# Patient Record
Sex: FEMALE | Race: WHITE | NOT HISPANIC OR LATINO | URBAN - METROPOLITAN AREA
[De-identification: names, ages, dates, MRNs, and addresses within clinical notes are randomized per-mention and may not be internally consistent; named-entity substitution may affect disease eponyms.]

---

## 2017-07-27 ENCOUNTER — ALLSCRIPTS OFFICE VISIT (OUTPATIENT)
Dept: OTHER | Facility: OTHER | Age: 27
End: 2017-07-27

## 2017-07-27 ENCOUNTER — GENERIC CONVERSION - ENCOUNTER (OUTPATIENT)
Dept: OTHER | Facility: OTHER | Age: 27
End: 2017-07-27

## 2017-09-07 ENCOUNTER — GENERIC CONVERSION - ENCOUNTER (OUTPATIENT)
Dept: OTHER | Facility: OTHER | Age: 27
End: 2017-09-07

## 2017-09-07 ENCOUNTER — ALLSCRIPTS OFFICE VISIT (OUTPATIENT)
Dept: OTHER | Facility: OTHER | Age: 27
End: 2017-09-07

## 2018-01-22 VITALS
HEIGHT: 65 IN | DIASTOLIC BLOOD PRESSURE: 78 MMHG | WEIGHT: 132 LBS | SYSTOLIC BLOOD PRESSURE: 120 MMHG | BODY MASS INDEX: 21.99 KG/M2

## 2018-01-22 VITALS
DIASTOLIC BLOOD PRESSURE: 60 MMHG | HEIGHT: 64 IN | SYSTOLIC BLOOD PRESSURE: 98 MMHG | WEIGHT: 132 LBS | BODY MASS INDEX: 22.53 KG/M2

## 2018-03-07 NOTE — CONSULTS
Plan    1  Azelastine HCl - 0 1 % Nasal Solution; INSERT 2 SQUIRTS IN EACH NOSTRIL   TWICE DAILY    2  Nasal endoscopy, diagnostic, unilateral/bilateral - POC; Status:Active - Perform Order;   Requested for:[id number];     Assessment    1  Nasal congestion (478 19) (R09 81)   2  History of environmental allergies (V15 09) (Z91 09)   3  Never a smoker   4  Social alcohol use (Z78 9)   5  Family history of cardiac disorder (V17 49) (Z82 49) : Father   6  Family history of diabetes mellitus (V18 0) (Z83 3) : Father   7  Employed   8  Single   9  Hypertrophy of inferior nasal turbinate (478 0) (J34 3)   10  Deviated nasal septum (470) (J34 2)   11  Allergic rhinitis due to other allergic trigger, unspecified chronicity, unspecified    seasonality (477 8) (J30 89)   12  Nasal polyposis (471 9) (J33 9)    Chief Complaint  Chief Complaint Free Text Note Form: Pt presents today with "nasal congestion for 4 weeks"      History of Present Illness  HPI: She presents with nasal congestion for the past 4 weeks  She has a history of associated allergies  She has been allergy tested and has known allergies to dust mites, grasses, and trees  She was taking Zyrtec for her symptoms  Symptoms worse in Spring/Summer  She has frequent nose blowing, sometimes productive of clear rhinorrhea, occasionally green  She denies recent URI symptoms  She has associated decrease in her sense of smell  She denies significant sinus pressure, pain or headaches  Denies ocular allergy symptoms  Some sneezing  For her symptoms, she has tried a neti-pot  She tried Vicks nasal spray for not more than 2 days  This helped initially  She was also given Dymista sample but stopped taking it because of insurance coverage issues  This did help her symptoms, though  Of note, she has a history of immunotherapy and stopped after 2 years for lack of beneficial effect        Review of Systems  Complete ENT ROS [facility]:   Eyes: No complaints of itching, excessive tearing or vision changes  Ears: No complaints of hearing loss, discharge, imbalance, recent ear infections, or tinnitus  Nose: obstruction, discharge or runny nose, epistaxis and loss of smell  Mouth: No sores in mouth, no altered taste, no dental problems  Throat: No complaints of throat pain, no difficulty swallowing, no hoarseness  Neck: No neck soreness, no neck pain, no neck lumps or swelling  Genitourinary: No complaints of dysuria, flank pain or frequent urination  Cardiovascular: No complaints of chest pain or palpitations  Respiratory: No complaints of shortness of breath, cough or wheezing  Gastrointestinal: No complaints of heartburn, nausea/vomiting, or constipation  Neurological: No complaints of headache, convulsions or memory loss  ROS Reviewed:   ROS reviewed  Vitals  Signs   Recorded: [id number] 53:48LR   Systolic: 692, RUE, Sitting  Diastolic: 78, RUE, Sitting  Height: 5 ft 4 5 in  Weight: 132 lb   BMI Calculated: 22 31  BSA Calculated: 1 65    Physical Exam  Constitutional:  Well developed, well nourished and groomed, in no acute distress  Eyes:  Extra-ocular movements intact, pupils equally round and reactive to light and accommodation, the lids and conjunctivae are normal in appearance  HEENT:    Head: Atraumatic, normocephalic, no visible scalp lesions, bony palpation unremarkable without stepoffs, parotid and submandibular salivary glands non-tender to palpation and without masses bilaterally  Ears:  Auricles normal in appearance bilaterally, mastoid prominence non-tender, external auditory canals clear bilaterally, tympanic membranes intact bilaterally without evidence of middle ear effusion or masses, normal appearing ossicles  Nose/Sinuses:  External appearance unremarkable, no maxillary or frontal sinus tenderness to palpation bilaterally, anterior rhinoscopy reveals edematous appearing mucosa, without polyps or masses   Deviated septum to the left with IT hypertrophy    Oral Cavity:  Moist mucus membranes, gums dentition unremarkable, no oral mucosal masses or lesions, floor of mouth soft, tongue mobile without masses or lesions  Oropharynx:  Base of tongue soft and without masses, tonsils bilaterally unremarkable, soft palate mucosa unremarkable, laryngeal mirror exam unrevealing  Neck:  No visible or palpable cervical lesions or lymphadenopathy, thyroid gland is normal in size and symmetry and without masses, normal laryngeal elevation with swallowing  Cardiovascular:  Normal rate and rhythm, no palpable thrills, no jugulovenous distension observed  Respiratory:  Normal respiratory effort without evidence of retractions or use of accessory muscles  Integument:  Normal appearing without observed masses or lesions  Neurologic:  Cranial nerves II-XII intact bilaterally  Psychiatric:  Alert and oriented to time, place and person, normal affect  Procedure  The patient was anesthetized with 2% lidocaine and oxymetazoline  Nasal endoscopy was there performed  Location: bilateral nasal cavities  Technique: flexible endoscopy  Findings: bilateral polypoid degeneration of the middle turbinates, IT hypertrophy, generalized mucosal edema and clear to white exudate  Impression: septal deviation and mucosal edema/polypoid changes with resultant nasal obstruction  NP was clear  Adenoids minimal     This was tolerated well  Discussion/Summary  Discussion Summary:   She has symptoms based primarily around allergic rhinitis and anatomical narrowing of her nasal cavity due to septal deviation and turbinate hypertrophy  This is best treated medically as initial therapy  For this, I have placed her on Astelin nasal spray BID and also BID budesonide rinses  Both prescribed  She will adhere to this regimen and follow up in 6 weeks for reassessment        Signatures   Electronically signed by : PORTER Henderson ; Jul 27 2017 11:06AM EST                       (Author)

## 2018-03-07 NOTE — PROGRESS NOTES
Assessment    1  Allergic rhinitis due to other allergic trigger, unspecified chronicity, unspecified   seasonality (477 8) (J30 89)    Chief Complaint  Chief Complaint Free Text Note Form: Marcela Erazo is here for f/u nasal congestion  She states she is feeling better  History of Present Illness  HPI: Doing much better after 6 weeks of budesonide rinses (daily) and Astelin spray  She has less nasal congestion and associated rhinorrhea  Her allergy symptoms are improved and now only mild in severity and intermittent  She breathes well through her nose and is very happy about that  No other complaints  Active Problems    1  Allergic rhinitis due to other allergic trigger, unspecified chronicity, unspecified   seasonality (477 8) (J30 89)   2  Deviated nasal septum (470) (J34 2)   3  Hypertrophy of inferior nasal turbinate (478 0) (J34 3)   4  Nasal congestion (478 19) (R09 81)   5  Nasal polyposis (471 9) (J33 9)    Past Medical History    1  History of environmental allergies (V15 09) (Z91 09)    Family History  Father    1  Family history of Celiac crisis   2  Family history of cardiac disorder (V17 49) (Z82 49)   3  Family history of diabetes mellitus (V18 0) (Z83 3)    Social History    · Employed   · Never a smoker   · Single   · Social alcohol use (Z78 9)    Current Meds   1  Azelastine HCl - 0 1 % Nasal Solution; INSERT 2 SQUIRTS IN EACH NOSTRIL TWICE   DAILY; Therapy: [id number] to (Last Rx:[id number])  Requested for: [id number] Ordered   2  ZyrTEC Allergy 10 MG Oral Tablet; Therapy: (Recorded:[id number]) to Recorded    Allergies    1   Seasonal    Vitals  Signs   Recorded: [id number] 16:13OY   Systolic: 98, RUE, Sitting  Diastolic: 60, RUE, Sitting  Height: 5 ft 4 in  Weight: 132 lb   BMI Calculated: 22 66  BSA Calculated: 1 64    Physical Exam  General: NAD  Nasal: normal appearing mucosa without masses, exudates or polyps -- bilaterally        Discussion/Summary  Discussion Summary:   Symptomatically improved on budesonide rinses and Astelin  She is ready to stop budesonide, but I authorized her to restart it if her symptoms return  She will continue to use Astelin and oral antihistamines on a prn basis  I will authorize refills as necessary  Follow up PRN        Signatures   Electronically signed by : PORTER Jose ; Sep  7 2017  8:38AM EST                       (Author)